# Patient Record
Sex: FEMALE | Race: WHITE | NOT HISPANIC OR LATINO | Employment: OTHER | URBAN - METROPOLITAN AREA
[De-identification: names, ages, dates, MRNs, and addresses within clinical notes are randomized per-mention and may not be internally consistent; named-entity substitution may affect disease eponyms.]

---

## 2020-03-25 PROBLEM — I10 BENIGN ESSENTIAL HTN: Status: ACTIVE | Noted: 2020-03-25

## 2020-03-25 PROBLEM — K21.9 CHRONIC GERD: Status: ACTIVE | Noted: 2020-03-25

## 2020-08-07 PROBLEM — I89.0 LYMPHEDEMA: Status: ACTIVE | Noted: 2020-08-07

## 2020-08-13 PROBLEM — I89.0 LYMPHEDEMA: Status: RESOLVED | Noted: 2020-08-07 | Resolved: 2020-08-13

## 2020-08-13 PROBLEM — R60.9 LIPEDEMA: Status: ACTIVE | Noted: 2020-08-13

## 2023-02-13 PROCEDURE — RXMED WILLOW AMBULATORY MEDICATION CHARGE: Performed by: SURGERY

## 2023-02-14 ENCOUNTER — PHARMACY VISIT (OUTPATIENT)
Dept: PHARMACY | Facility: MEDICAL CENTER | Age: 68
End: 2023-02-14
Payer: COMMERCIAL

## 2023-04-21 PROBLEM — M21.41 ACQUIRED PES PLANUS OF RIGHT FOOT: Status: ACTIVE | Noted: 2023-01-23

## 2023-04-21 PROBLEM — M17.12 PRIMARY OSTEOARTHRITIS OF LEFT KNEE: Status: ACTIVE | Noted: 2021-08-24

## 2023-04-28 PROBLEM — I35.1 AORTIC INSUFFICIENCY: Status: ACTIVE | Noted: 2023-04-28

## 2023-09-26 ENCOUNTER — NON-PROVIDER VISIT (OUTPATIENT)
Dept: CARDIOLOGY | Facility: MEDICAL CENTER | Age: 68
End: 2023-09-26

## 2023-09-26 DIAGNOSIS — I49.8 VENTRICULAR BIGEMINY: ICD-10-CM

## 2023-09-26 DIAGNOSIS — I10 BENIGN ESSENTIAL HTN: ICD-10-CM

## 2023-09-26 DIAGNOSIS — I49.8 VENTRICULAR TRIGEMINY: ICD-10-CM

## 2023-09-26 DIAGNOSIS — I47.10 SVT (SUPRAVENTRICULAR TACHYCARDIA) (HCC): ICD-10-CM

## 2023-09-26 DIAGNOSIS — R07.9 CHEST PAIN, UNSPECIFIED TYPE: ICD-10-CM

## 2023-09-26 DIAGNOSIS — I49.1 APC (ATRIAL PREMATURE CONTRACTIONS): ICD-10-CM

## 2023-09-26 DIAGNOSIS — I77.810 THORACIC AORTIC ECTASIA (HCC): Chronic | ICD-10-CM

## 2023-09-26 DIAGNOSIS — I49.3 PVC'S (PREMATURE VENTRICULAR CONTRACTIONS): ICD-10-CM

## 2023-09-26 PROBLEM — E78.5 HYPERLIPIDEMIA: Status: ACTIVE | Noted: 2023-09-21

## 2023-09-26 PROBLEM — M19.90 ARTHRITIS: Status: ACTIVE | Noted: 2023-02-20

## 2023-09-26 PROBLEM — K63.5 POLYP OF COLON: Status: ACTIVE | Noted: 2023-01-22

## 2023-09-26 PROBLEM — K44.9 HIATAL HERNIA: Status: ACTIVE | Noted: 2023-01-22

## 2023-10-20 ENCOUNTER — TELEPHONE (OUTPATIENT)
Dept: CARDIOLOGY | Facility: MEDICAL CENTER | Age: 68
End: 2023-10-20

## 2023-10-21 PROCEDURE — 93248 EXT ECG>7D<15D REV&INTERPJ: CPT | Performed by: INTERNAL MEDICINE

## 2023-10-30 ENCOUNTER — HOSPITAL ENCOUNTER (OUTPATIENT)
Dept: RADIOLOGY | Facility: MEDICAL CENTER | Age: 68
End: 2023-10-30

## 2023-12-06 ENCOUNTER — TELEPHONE (OUTPATIENT)
Dept: CARDIOLOGY | Facility: MEDICAL CENTER | Age: 68
End: 2023-12-06

## 2023-12-06 NOTE — LETTER
PROCEDURE/SURGERY CLEARANCE FORM      Encounter Date: 12/6/2023    Patient: Denise Morse  YOB: 1955    CARDIOLOGIST:  Edward Brewer M.D.    REFERRING DOCTOR:  No ref. provider found      The above patient is cleared to have the following procedure/surgery:  Laparoscopic Paraesophageal Hernia Repair                                             Additional comments: She can proceed with the proposed procedure or surgery from a cardiac standpoint, no modifiable cardiovascular risk, no further cardiac testing required.     It is my pleasure to participate in the care of Ms. Morse.  Please do not hesitate to contact me with questions or concerns. [facility] is available 24/7 for consultative services at [phone] in the perioperative period.     Electronically Signed     Edward Brewer MD PhD [facility]  Cardiologist [facility]     Please note that this dictation was created using voice recognition software. There may be errors I did not discover before finalizing the note.

## 2023-12-06 NOTE — TELEPHONE ENCOUNTER
Last OV: 09/26/2023  Proposed Surgery: Laparoscopic Paraesophageal Hernia Repair   Surgery Date: No date stated   Requesting Office Name: [facility]   Fax Number: [phone]  Preference of Location (default is surgery center unless specified by Cardiologist or IMANI)  Prior Clearance Addressed: No      Anticoags/Antiplatelets: Other none   Outstanding Cardiac Imaging : No  Stent, Cardiac Devices, or Catheterization: No  Ablation, TAVR/Valve (including open heart), Cardioversion: No  Recent Cardiac Hospitalization: Yes  Date:  09/20/2023            When: Hospitalized in the last 6 months. Forward to provider to review.   History (cardiac history):   Past Medical History:   Diagnosis Date    Benign essential HTN     Blepharitis     Dry eye     Endometriosis     GERD (gastroesophageal reflux disease) 2014    EGD in [place]    Heart murmur     Impaired hearing     Lipedema     Mild ascending aorta dilatation (HCC)     Mitral valve regurgitation     Rhinorrhea 05/2019    ENT started nasal ipratropium    Seasonal allergies     Thoracic aortic ectasia (HCC) 4 cm 2023     Vaginal dryness, menopausal     estrace .01% cream a few times per week             Surgical Clearance Letter Sent: NO. Provider to advise.   **Scan clearance request letter into [employer].**

## 2023-12-07 NOTE — TELEPHONE ENCOUNTER
She can proceed with the proposed procedure or surgery from a cardiac standpoint, no modifiable cardiovascular risk, no further cardiac testing required.    It is my pleasure to participate in the care of Ms. Morse.  Please do not hesitate to contact me with questions or concerns. [facility] is available 24/7 for consultative services at [phone] in the perioperative period.    Electronically Signed    Edward Brewer MD PhD [facility]  Cardiologist [facility]    Please note that this dictation was created using voice recognition software. There may be errors I did not discover before finalizing the note.

## 2023-12-16 ENCOUNTER — PHARMACY VISIT (OUTPATIENT)
Dept: PHARMACY | Facility: MEDICAL CENTER | Age: 68
End: 2023-12-16
Payer: COMMERCIAL

## 2023-12-16 PROCEDURE — RXMED WILLOW AMBULATORY MEDICATION CHARGE: Performed by: SURGERY

## 2023-12-16 RX ORDER — OXYCODONE HCL 5 MG/5 ML
SOLUTION, ORAL ORAL
Qty: 100 ML | Refills: 0 | OUTPATIENT
Start: 2023-12-16 | End: 2023-12-18 | Stop reason: SDUPTHER

## 2024-08-06 ENCOUNTER — HOSPITAL ENCOUNTER (OUTPATIENT)
Dept: RADIOLOGY | Facility: MEDICAL CENTER | Age: 69
End: 2024-08-06
Attending: SURGERY
Payer: MEDICARE

## 2024-08-06 DIAGNOSIS — K59.09 OTHER CONSTIPATION: ICD-10-CM
